# Patient Record
Sex: MALE | Race: AMERICAN INDIAN OR ALASKA NATIVE
[De-identification: names, ages, dates, MRNs, and addresses within clinical notes are randomized per-mention and may not be internally consistent; named-entity substitution may affect disease eponyms.]

---

## 2019-02-27 ENCOUNTER — HOSPITAL ENCOUNTER (EMERGENCY)
Dept: HOSPITAL 5 - ED | Age: 34
Discharge: HOME | End: 2019-02-27
Payer: COMMERCIAL

## 2019-02-27 VITALS — DIASTOLIC BLOOD PRESSURE: 89 MMHG | SYSTOLIC BLOOD PRESSURE: 146 MMHG
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
## 2019-02-27 DIAGNOSIS — S61.219A: Primary | ICD-10-CM

## 2019-02-27 DIAGNOSIS — F17.200: ICD-10-CM

## 2019-02-27 DIAGNOSIS — I10: ICD-10-CM
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
## 2019-02-27 PROCEDURE — 90715 TDAP VACCINE 7 YRS/> IM: CPT

## 2019-02-27 PROCEDURE — 99283 EMERGENCY DEPT VISIT LOW MDM: CPT

## 2019-02-27 PROCEDURE — 90471 IMMUNIZATION ADMIN: CPT

## 2019-02-27 NOTE — EMERGENCY DEPARTMENT REPORT
ED Upper Extremity Inj HPI





- General


Chief Complaint: Wound/Laceration


Stated Complaint: LACERATION TO FINGER


Time Seen by Provider: 02/27/19 02:14


Source: EMS


Mode of arrival: Ambulatory


Limitations: No Limitations





- History of Present Illness


Initial Comments: 





A 33-year-old Afro-American male was walking down an embankment on yesterday 

where he lost his footing slipped and now it is hands behind him, hitting a 

sharp object of an unknown type resulting in a laceration to his finger.  He 

tried to self treat with the wound continue to remain open and having some dull 

throbbing pain about 12 hours later seeks evaluation emergency Department.  The 

laceration is.  It is irregular and about 4 cm in length to 2 cm wide dull 

throbbing pain, worse with palpation or range of motion.


MD Complaint: Injury to:: right


Other Extremity Injury: Fingers: Right


Other Injuries: none


Handedness: right


Place: home


Improves With: none


Worsens With: none


Context: laceration, injury


Associated Symptoms: denies other symptoms.  denies: numbness, neck pain, 

suspects foreign body, nausea/vomiting, heard/felt popping sensat





- Related Data


                                  Previous Rx's











 Medication  Instructions  Recorded  Last Taken  Type


 


Acetaminophen/Codeine [Tylenol #3] 1 tab PO Q6H PRN #15 tab 03/14/16 Unknown Rx


 


cephALEXin [Keflex] 500 mg PO Q6HR #40 capsule 03/14/16 Unknown Rx











                                    Allergies











Allergy/AdvReac Type Severity Reaction Status Date / Time


 


No Known Allergies Allergy   Unverified 03/14/16 17:26














ED Review of Systems


ROS: 


Stated complaint: LACERATION TO FINGER


Other details as noted in HPI





Constitutional: denies: chills, fever


Eyes: denies: eye pain, eye discharge, vision change


ENT: denies: ear pain, throat pain


Respiratory: denies: cough, shortness of breath, wheezing


Cardiovascular: denies: chest pain, palpitations


Endocrine: no symptoms reported


Gastrointestinal: denies: abdominal pain, nausea, diarrhea


Genitourinary: denies: urgency, dysuria


Musculoskeletal: denies: back pain, joint swelling, arthralgia


Skin: other (laceration to finger).  denies: rash, lesions


Neurological: denies: headache, weakness, paresthesias


Psychiatric: denies: anxiety, depression


Hematological/Lymphatic: denies: easy bleeding, easy bruising





ED Past Medical Hx





- Past Medical History


Hx Hypertension: Yes





- Surgical History


Past Surgical History?: No





- Social History


Smoking Status: Current Every Day Smoker


Substance Use Type: None





- Medications


Home Medications: 


                                Home Medications











 Medication  Instructions  Recorded  Confirmed  Last Taken  Type


 


Acetaminophen/Codeine [Tylenol #3] 1 tab PO Q6H PRN #15 tab 03/14/16  Unknown Rx


 


cephALEXin [Keflex] 500 mg PO Q6HR #40 capsule 03/14/16  Unknown Rx














ED Physical Exam





- General


Limitations: No Limitations


General appearance: alert, in no apparent distress





- Head


Head exam: Present: atraumatic, normocephalic





- Eye


Eye exam: Present: normal appearance, PERRL, EOMI.  Absent: scleral icterus, 

conjunctival injection, periorbital swelling, periorbital tenderness


Pupils: Present: normal accommodation





- ENT


ENT exam: Present: normal exam, normal orophraynx, mucous membranes moist, TM's 

normal bilaterally





- Neck


Neck exam: Present: normal inspection, full ROM





- Respiratory


Respiratory exam: Present: normal lung sounds bilaterally.  Absent: respiratory 

distress, wheezes, rales, chest wall tenderness, accessory muscle use





- Cardiovascular


Cardiovascular Exam: Present: regular rate, normal rhythm.  Absent: systolic 

murmur, diastolic murmur, rubs, gallop





- GI/Abdominal


GI/Abdominal exam: Present: soft, normal bowel sounds





- Rectal


Rectal exam: Present: deferred





- Extremities Exam


Extremities exam: Present: normal inspection





- Back Exam


Back exam: Present: normal inspection





- Neurological Exam


Neurological exam: Present: alert, oriented X3





- Psychiatric


Psychiatric exam: Present: normal affect, normal mood





- Skin


Skin exam: Present: warm, dry, intact, normal color.  Absent: rash





ED Course





                                   Vital Signs











  02/27/19





  01:26


 


Temperature 98.6 F


 


Pulse Rate 77


 


Respiratory 18





Rate 


 


Blood Pressure 146/89


 


O2 Sat by Pulse 98





Oximetry 











Critical care attestation.: 


If time is entered above; I have spent that time in minutes in the direct care 

of this critically ill patient, excluding procedure time.








ED Disposition


Condition: Stable Event Note